# Patient Record
Sex: FEMALE | Race: BLACK OR AFRICAN AMERICAN | Employment: UNEMPLOYED | URBAN - METROPOLITAN AREA
[De-identification: names, ages, dates, MRNs, and addresses within clinical notes are randomized per-mention and may not be internally consistent; named-entity substitution may affect disease eponyms.]

---

## 2019-09-10 ENCOUNTER — HOSPITAL ENCOUNTER (EMERGENCY)
Facility: CLINIC | Age: 19
Discharge: HOME | End: 2019-09-10
Attending: PHYSICIAN ASSISTANT | Admitting: PHYSICIAN ASSISTANT
Payer: COMMERCIAL

## 2019-09-10 VITALS
TEMPERATURE: 98.8 F | DIASTOLIC BLOOD PRESSURE: 78 MMHG | RESPIRATION RATE: 18 BRPM | SYSTOLIC BLOOD PRESSURE: 110 MMHG | OXYGEN SATURATION: 99 % | HEART RATE: 85 BPM

## 2019-09-10 DIAGNOSIS — N64.4 BREAST TENDERNESS IN FEMALE: ICD-10-CM

## 2019-09-10 DIAGNOSIS — N63.10 LUMP OF RIGHT BREAST: ICD-10-CM

## 2019-09-10 DIAGNOSIS — N89.8 VAGINAL ODOR: ICD-10-CM

## 2019-09-10 LAB
ALBUMIN UR-MCNC: NEGATIVE MG/DL
APPEARANCE UR: CLEAR
BACTERIA #/AREA URNS HPF: ABNORMAL /HPF
BILIRUB UR QL STRIP: NEGATIVE
COLOR UR AUTO: ABNORMAL
GLUCOSE UR STRIP-MCNC: NEGATIVE MG/DL
HCG UR QL: NEGATIVE
HGB UR QL STRIP: NEGATIVE
KETONES UR STRIP-MCNC: NEGATIVE MG/DL
LEUKOCYTE ESTERASE UR QL STRIP: NEGATIVE
MUCOUS THREADS #/AREA URNS LPF: PRESENT /LPF
NITRATE UR QL: NEGATIVE
PH UR STRIP: 7.5 PH (ref 5–7)
RBC #/AREA URNS AUTO: <1 /HPF (ref 0–2)
SOURCE: ABNORMAL
SP GR UR STRIP: 1 (ref 1–1.03)
SPECIMEN SOURCE: NORMAL
SQUAMOUS #/AREA URNS AUTO: <1 /HPF (ref 0–1)
UROBILINOGEN UR STRIP-MCNC: NORMAL MG/DL (ref 0–2)
WBC #/AREA URNS AUTO: <1 /HPF (ref 0–5)
WET PREP SPEC: NORMAL

## 2019-09-10 PROCEDURE — 87210 SMEAR WET MOUNT SALINE/INK: CPT | Performed by: PHYSICIAN ASSISTANT

## 2019-09-10 PROCEDURE — 81001 URINALYSIS AUTO W/SCOPE: CPT | Performed by: PHYSICIAN ASSISTANT

## 2019-09-10 PROCEDURE — 87591 N.GONORRHOEAE DNA AMP PROB: CPT | Performed by: PHYSICIAN ASSISTANT

## 2019-09-10 PROCEDURE — 81025 URINE PREGNANCY TEST: CPT | Performed by: PHYSICIAN ASSISTANT

## 2019-09-10 PROCEDURE — 99284 EMERGENCY DEPT VISIT MOD MDM: CPT

## 2019-09-10 PROCEDURE — 87491 CHLMYD TRACH DNA AMP PROBE: CPT | Performed by: PHYSICIAN ASSISTANT

## 2019-09-10 ASSESSMENT — ENCOUNTER SYMPTOMS
FEVER: 0
DYSURIA: 1
ABDOMINAL PAIN: 1
CHILLS: 0
COLOR CHANGE: 0

## 2019-09-10 NOTE — ED AVS SNAPSHOT
[facility] Emergency Department  [address]  [facility] [address]  Phone:  [phone]  Fax:  [phone]                                    Lexy Marshall   MRN: [id number]    Department:  [facility] Emergency Department   Date of Visit:  9/10/2019           After Visit Summary Signature Page    I have received my discharge instructions, and my questions have been answered. I have discussed any challenges I see with this plan with the nurse or doctor.    ..........................................................................................................................................  Patient/Patient Representative Signature      ..........................................................................................................................................  Patient Representative Print Name and Relationship to Patient    ..................................................               ................................................  Date                                   Time    ..........................................................................................................................................  Reviewed by Signature/Title    ...................................................              ..............................................  Date                                               Time          22EPIC Rev 08/18

## 2019-09-11 LAB
C TRACH DNA SPEC QL NAA+PROBE: NEGATIVE
N GONORRHOEA DNA SPEC QL NAA+PROBE: NEGATIVE
SPECIMEN SOURCE: NORMAL
SPECIMEN SOURCE: NORMAL

## 2019-09-11 NOTE — DISCHARGE INSTRUCTIONS
This is likely a cyst, but you need US at breast center.   Contact [facility] tomorrow - I have placed order.   Return here for increased pain, nipple discharge, warmth.

## 2019-09-11 NOTE — RESULT ENCOUNTER NOTE
Final result for both N. Gonorrhoeae PCR and Chlamydia Trachomatis PCR are NEGATIVE.  No treatment or change in treatment per [facility] ED Lab Result protocol.

## 2019-09-11 NOTE — ED PROVIDER NOTES
History     Chief Complaint:  Breast Mass    HPI   Lexy Marshall is a 19 year old female with a history of ovarian and pilonidal cysts who presents to the emergency department with her girlfriend for evaluation of a breast mass. The patient reports that this morning at 0100 she noticed a mass in her right breast that was non-mobile with palpation. There was no associated redness, swelling, fevers, or chills. She also got her period today and has since been having some abdominal cramping. Additionally, she had a few episodes of a dysuria when urinating with a foul odor a few days ago. Patient denies use of birth control or concern for STDs.     Allergies:  No Known Drug Allergies     Medications:    Abilify  Caphosol  Orajel  Melatonin     Past Medical History:    Overdose  Depression   Ovarian cyst    Past Surgical History:    Pilonidal cyst excision     Family History:    Unknown     Social History:  Tobacco Use: Some day smoker  Alcohol Use: No  PCP: Physician No Ref-Primary  Marital Status:  Single      Review of Systems   Constitutional: Negative for chills and fever.   Gastrointestinal: Positive for abdominal pain.   Genitourinary: Positive for dysuria.   Musculoskeletal:        Positive for right breast mass   Skin: Negative for color change.   All other systems reviewed and are negative.    Physical Exam     Patient Vitals for the past 24 hrs:   BP Temp Pulse Heart Rate Resp SpO2   09/10/19 2025 -- -- 85 85 18 99 %   09/10/19 1853 110/78 98.8  F (37.1  C) 99 99 16 98 %     Physical Exam  General: Alert and interactive. Appears well. Cooperative and pleasant.   Eyes: The pupils are equal and round. EOMs intact. No scleral icterus.  ENT: No abnormalities to the external nose or ears. Mucous membranes moist. Posterior oropharynx is non-erythematous.      Neck: Trachea is in the midline. No nuchal rigidity.     CV: Regular rate and rhythm. S1 and S2 normal without murmur, click, gallop or rub.   Breast: Fibrous  breasts bilaterally. Patient has small 1 cm lump at about the 5 o'clock position in the right breast that is freely mobile and minimally tender to palpation. No significant erythema. No nipple discharge.   Resp: Breath sounds are clear bilaterally, without rhonchi, wheezes, rales. Non-labored, no retractions or accessory muscle use.     GI: Abdomen is soft without distension. No tenderness to palpation. No peritoneal signs.    : Scant bleeding in vaginal canal. No CMT.   MS: Moving all extremities well. Good muscle tone.   Skin: Warm and dry. No rash or lesions noted.  Neuro: Alert and oriented x 3. No focal neurologic deficits. Good strength and sensation in upper and lower extremities.    Psych: Awake. Alert.  Normal affect. Appropriate interactions.  Lymph: No anterior or posterior cervical lymphadenopathy noted.    Emergency Department Course   Laboratory:  UA: Clear straw urine, pH: 7.5 (H), bacteria: few, mucous: present, otherwise WNL    Few PMNs seen. No Trichomonas seen. No clue cells seen. No yeast seen.    HCG qualitative urine: Negative    Neisseria gonorrhea PCR: In process  Chlamydia trachomatis PCR: In process    Emergency Department Course:  1907 Nursing notes and vitals reviewed. I performed an exam of the patient as documented above.     The patient provided a urine sample here in the emergency department. This was sent for laboratory testing, findings above.     1940 I performed a pelvic exam. Swaps were sent.     2020 I rechecked the patient and discussed the results of her workup thus far.     Findings and plan explained to the Patient. Patient discharged home with instructions regarding supportive care, medications, and reasons to return. The importance of close follow-up was reviewed.     I personally reviewed the laboratory results with the Patient and answered all related questions prior to discharge.     Impression & Plan    Medical Decision Making:  This is a 19 year old female who  presents with her significant other for evaluation of a lump in her right breast. She is currently on her menstrual cycle but noted she had a non-mobile lump at about the 6:00 position of her right breast. She has fibrous breasts bilaterally, but on examination I am able to palpate a 1 cm freely mobile lump in the right breast. This is minimally tender to palpation. There is no nipple discharge, erythema, or signs or symptoms of infection at this time. I have explained that our breast radiologist prefer that any diagnostic breast examination be sent over to the breast center. I placed an order for an US with mammography as deemed necessary, and I gave the patient information for the breast center. Additionally, the patient complains of vaginal odor and had some burning with urination a few days ago. Her urine her shows no signs of infection and she is not currently pregnant. I did perform a pelvic exam but see no signs of bacterial vaginosis or yeast infection on her wet prep. Gonorrhea and chlamydia are pending at this time. The patient has no concerns of sexually transmitted infection and will not be treated empirically. She has no cervical motion tenderness, fevers, chills, or lower abdominal pain to suggest pelvic inflammatory disease. She will follow up with the breast center and I have given her information for primary care if she were to require a referral to primary care for a breast US. She is stable for discharge.     Diagnosis:    ICD-10-CM    1. Lump of right breast N63.10    2. Breast tenderness in female N64.4    3. Vaginal odor N89.8        Disposition:  Discharged to home    Scribe Disclosure:  I, Alberto Pham, am serving as a scribe on 9/10/2019 at 7:07 PM to personally document services performed by Maya Murphy PA-C based on my observations and the provider's statements to me.     Alberto Pham  9/10/2019   [facility] EMERGENCY DEPARTMENT       Maya Murphy  FRANCESCO  09/10/19 0540

## 2020-04-08 ENCOUNTER — HOSPITAL ENCOUNTER (EMERGENCY)
Facility: CLINIC | Age: 20
Discharge: HOME | End: 2020-04-08
Attending: EMERGENCY MEDICINE | Admitting: EMERGENCY MEDICINE
Payer: COMMERCIAL

## 2020-04-08 VITALS
HEART RATE: 88 BPM | TEMPERATURE: 98.6 F | RESPIRATION RATE: 16 BRPM | SYSTOLIC BLOOD PRESSURE: 121 MMHG | WEIGHT: 113 LBS | DIASTOLIC BLOOD PRESSURE: 87 MMHG | OXYGEN SATURATION: 99 %

## 2020-04-08 DIAGNOSIS — L02.31 ABSCESS OF RIGHT BUTTOCK: ICD-10-CM

## 2020-04-08 PROCEDURE — 10060 I&D ABSCESS SIMPLE/SINGLE: CPT

## 2020-04-08 PROCEDURE — 76857 US EXAM PELVIC LIMITED: CPT

## 2020-04-08 PROCEDURE — 99284 EMERGENCY DEPT VISIT MOD MDM: CPT | Mod: 25

## 2020-04-08 RX ORDER — CEPHALEXIN 500 MG/1
500 CAPSULE ORAL 4 TIMES DAILY
Qty: 28 CAPSULE | Refills: 0 | Status: SHIPPED | OUTPATIENT
Start: 2020-04-08 | End: 2020-04-15

## 2020-04-08 RX ORDER — LIDOCAINE HYDROCHLORIDE AND EPINEPHRINE 10; 10 MG/ML; UG/ML
INJECTION, SOLUTION INFILTRATION; PERINEURAL
Status: DISCONTINUED
Start: 2020-04-08 | End: 2020-04-08 | Stop reason: HOSPADM

## 2020-04-08 RX ORDER — SULFAMETHOXAZOLE/TRIMETHOPRIM 800-160 MG
2 TABLET ORAL 2 TIMES DAILY
Qty: 28 TABLET | Refills: 0 | Status: SHIPPED | OUTPATIENT
Start: 2020-04-08 | End: 2020-04-15

## 2020-04-08 RX ORDER — HYDROCODONE BITARTRATE AND ACETAMINOPHEN 5; 325 MG/1; MG/1
1 TABLET ORAL EVERY 6 HOURS PRN
Qty: 8 TABLET | Refills: 0 | Status: SHIPPED | OUTPATIENT
Start: 2020-04-08 | End: 2020-04-11

## 2020-04-08 ASSESSMENT — ENCOUNTER SYMPTOMS
FEVER: 0
SHORTNESS OF BREATH: 0
VOMITING: 0

## 2020-04-08 NOTE — ED AVS SNAPSHOT
[facility] Emergency Department  [address]  [facility] [address]  Phone:  [phone]  Fax:  [phone]                                    Lexy Marshall   MRN: [id number]    Department:  [facility] Emergency Department   Date of Visit:  4/8/2020           After Visit Summary Signature Page    I have received my discharge instructions, and my questions have been answered. I have discussed any challenges I see with this plan with the nurse or doctor.    ..........................................................................................................................................  Patient/Patient Representative Signature      ..........................................................................................................................................  Patient Representative Print Name and Relationship to Patient    ..................................................               ................................................  Date                                   Time    ..........................................................................................................................................  Reviewed by Signature/Title    ...................................................              ..............................................  Date                                               Time          22EPIC Rev 08/18

## 2020-04-08 NOTE — DISCHARGE INSTRUCTIONS
Please make an appointment to follow up with [facility] [phone] in 3 days even if entirely better.

## 2020-04-08 NOTE — LETTER
April 8, 2020      To Whom It May Concern:      Lexy Marshall was seen in our Emergency Department today, 04/08/20.  I expect her condition to improve over the next 2-3 days.  She may return to work/school when improved.    Sincerely,        Kenia Swartz RN

## 2020-04-08 NOTE — ED PROVIDER NOTES
History     Chief Complaint:  Wound Check       HPI   Lexy Marsahll is a 20 year old female who presents with right buttock pain x1 day.  She has a history of buttock abscess that has required incision and drainage on 2 separate occasions.  She began to have the progressive onset of pain at the same location yesterday.  The pain is sharp, localized and aggravated by any pressure or touching to the area.  She has otherwise felt well with no nausea, vomiting, fever or myalgias..    Allergies:  No Known Allergies     Medications:    abilify     Past Medical History:    Depressed affect   Bipolar 2 disorder  Anxiety     Past Surgical History:    Excise pilonidal cyst    Family History:    Family history reviewed. No pertinent family history.      Social History:  Tobacco use: yes  Alcohol use: no    Review of Systems   Constitutional: Negative for fever.   Respiratory: Negative for shortness of breath.    Gastrointestinal: Negative for vomiting.   All other systems reviewed and are negative.      Physical Exam     Patient Vitals for the past 24 hrs:   BP Temp Temp src Pulse Heart Rate Resp SpO2 Weight   04/08/20 0516 121/87 98.6  F (37  C) Oral 88 88 16 99 % 51.3 kg (113 lb)        Physical Exam  Skin:               General:   Pleasant, age appropriate.  Eyes:    Conjunctiva normal  Neck:    Supple, no meningismus.     CV:     Regular rate and rhythm.      No murmurs, rubs or gallops.       No  lower extremity edema.  PULM:    Clear to auscultation bilateral.       No respiratory distress.      Good air exchange.  ABD:    Soft, non-tender, non-distended.       No rebound, guarding or rigidity.  MSK:     No gross deformity to all four extremities.   LYMPH:   No cervical lymphadenopathy.  NEURO:   Alert, good muscular tone, no atrophy.   Skin:    Warm, dry   Psych:    Mood is good and affect is appropriate.        Emergency Department Course     Procedures:      Procedure: Incision and Drainage   LOCATIONS:  Right  buttock     ANESTHESIA:  Local field block using Lidocaine 1% with epinephrine, total of 3 mLs     PREPARATION:  Cleansed with Betadine     PROCEDURE:  Area was incised with # 11 Blade (Sharp Point) with a Single Straight incision.  Wound treatment included Deloculation and Purulent Drainage.  Packing consisted of No Packing.  Appropriate dressing was applied to cover the area.    Patient Status:        Patient tolerated the procedure well. There were no complications.              Emergency Department Course:  Past medical records, nursing notes, and vitals reviewed.    0524 I performed an exam of the patient as documented above.     0558 Patient rechecked and updated.       Findings and plan explained to the Patient. Patient discharged home with instructions regarding supportive care, medications, and reasons to return. The importance of close follow-up was reviewed. The patient was prescribed keflex, norco, bactrim DS.        Impression & Plan     Medical Decision Making:    Lxey Marshall is a 20 year old female who presents to the emergency department today with progressive right buttock pain with prior history of buttock abscess.  On ultrasound there is a small fluid collection measuring 0.6 cm but appears to be tracking throughout the soft tissue. She underwent incision and drainage with a small amount of purulent material returned.  Patient will be discharged home on cephalexin and Bactrim.  Sitz baths and analgesics provided.  Follow-up with PCP and return to the ED for any worsening symptoms.      Discharge Diagnosis:    ICD-10-CM    1. Abscess of right buttock  L02.31        Disposition:  Discharged home.      Discharge Medications:  New Prescriptions    CEPHALEXIN (KEFLEX) 500 MG CAPSULE    Take 1 capsule (500 mg) by mouth 4 times daily for 7 days    HYDROCODONE-ACETAMINOPHEN (NORCO) 5-325 MG TABLET    Take 1 tablet by mouth every 6 hours as needed for severe pain    SULFAMETHOXAZOLE-TRIMETHOPRIM (BACTRIM  DS) 800-160 MG TABLET    Take 2 tablets by mouth 2 times daily for 7 days       Scribe Disclosure:  I, Sae Ana, am serving as a scribe at 5:24 AM on 4/8/2020 to document services personally performed by Francisco Javier Boateng MD based on my observations and the provider's statements to me.       Francisco Javier Boateng MD  04/08/20 0703

## 2020-04-16 ENCOUNTER — TELEPHONE (OUTPATIENT)
Dept: SURGERY | Facility: CLINIC | Age: 20
End: 2020-04-16

## 2020-04-16 ENCOUNTER — HOSPITAL ENCOUNTER (EMERGENCY)
Facility: CLINIC | Age: 20
Discharge: HOME | End: 2020-04-16
Attending: PHYSICIAN ASSISTANT | Admitting: PHYSICIAN ASSISTANT
Payer: COMMERCIAL

## 2020-04-16 VITALS — TEMPERATURE: 98 F | SYSTOLIC BLOOD PRESSURE: 116 MMHG | OXYGEN SATURATION: 99 % | DIASTOLIC BLOOD PRESSURE: 86 MMHG

## 2020-04-16 DIAGNOSIS — Z48.00 ENCOUNTER FOR CHANGE OF DRESSING: ICD-10-CM

## 2020-04-16 DIAGNOSIS — L05.91 PILONIDAL CYST: ICD-10-CM

## 2020-04-16 PROCEDURE — 99282 EMERGENCY DEPT VISIT SF MDM: CPT

## 2020-04-16 ASSESSMENT — ENCOUNTER SYMPTOMS
CHILLS: 0
FEVER: 0
WOUND: 1

## 2020-04-16 NOTE — TELEPHONE ENCOUNTER
PT called for a silvai consult today after being seen in the ED.  I had KARIN review the chart and she determined that this PT needs to wait for a consult for now.      Her notes: Based on the ED note it sounds like the acute abscess infection is appropriately treated with the I&D and now she just needs time for wound healing. We would not be able to offer her any definitive surgery at this point. I would recommend follow up with the surgeon or physician who did the most recent I&D procedure or her PCP to document ongoing healing. She should continue daily packing changes at home. Once the infection is resolved she can come follow up with us in the future for definitive excision of the pilonidal sinus areas but that will likely not be for at least a month     mms

## 2020-04-16 NOTE — DISCHARGE INSTRUCTIONS
Have your girlfriend or primary care change this dressing in 48 hours.   Watch for fevers/chills, increased pain at that area.   Make a follow up appointment with general surgery.

## 2020-04-16 NOTE — ED AVS SNAPSHOT
[facility] Emergency Department  [address]  [facility] [address]  Phone:  [phone]  Fax:  [phone]                                    Lexy Marshall   MRN: [id number]    Department:  [facility] Emergency Department   Date of Visit:  4/16/2020           After Visit Summary Signature Page    I have received my discharge instructions, and my questions have been answered. I have discussed any challenges I see with this plan with the nurse or doctor.    ..........................................................................................................................................  Patient/Patient Representative Signature      ..........................................................................................................................................  Patient Representative Print Name and Relationship to Patient    ..................................................               ................................................  Date                                   Time    ..........................................................................................................................................  Reviewed by Signature/Title    ...................................................              ..............................................  Date                                               Time          22EPIC Rev 08/18

## 2020-04-16 NOTE — ED PROVIDER NOTES
"  History   Chief Complaint:  Wound Check     HPI   Lexy Marshall is a 20 year old female, current some day smoker who presents for evaluation of a wound check. The patient reports that she was seen here in the ED on 04/08 for evaluation of an abscess on her right buttock at which time an incision and drainage procedure was performed. She was discharged home with a one-week course of Bactrim and Keflex which she has now finished. She states that she continued to have pain several days after that visit, therefore she went to [facility] for further evaluation three days ago. She notes another, \"deeper\" abscess was found and another incision and drainage was performed followed by packing. The patient presents today to have the packing replaced as she does not have any at home. In the ED, she states she has some persisting pain though it is much improved. She denies fever and chills. The patient has a history of similar abscess but has never seen a surgeon for this.     Allergies:  No known drug allergies    Medications:   Abilify  Melatonin    Past Medical History:    Depressed affect  Overdose    Past Surgical History:    Excise pilonidal cyst, simple     Family History:    History reviewed. No pertinent family history.     Social History:  Smoking status- current some day smoker  Alcohol use- no  Drug use- no    Review of Systems   Constitutional: Negative for chills and fever.   Skin: Positive for wound (abscess to right buttock, painful).   All other systems reviewed and are negative.      Physical Exam     Patient Vitals for the past 24 hrs:   BP Temp Temp src Heart Rate SpO2   04/16/20 1127 116/86 98  F (36.7  C) Temporal 73 99 %     Physical Exam  General: Alert and interactive. Appears well.   Head: Atraumatic, without obvious lesion, abrasion, hematoma.   Eyes: The pupils are equal and round. No scleral icterus.   ENT: No obvious abnormalities to the ears or nose. Mucous membranes moist.   Neck:Trachea is " in the midline. No obvious swelling to the neck. Full range of motion.   CV: Regular rate. Extremities well perfused.  Resp: Non-labored, no retractions or accessory muscle use.     GI: Abdomen is not distended.   MS: Moving all extremities well.   Skin: Two 1.5 cm vertical incisions present to the right of the midline buttocks, superior aspect of gluteal cleft. Small amount of swelling, no induration or erythema. Two pieces of gauze present within the cavity of previous drainage. No significant tenderness to palpation.  Neuro: Alert and oriented x 3. Non-focal examination.    Psych: Awake. Alert.  Normal affect. Appropriate interactions.    Emergency Department Course     Procedures      Wound packing      INDICATION: Packing replacement    PERFORMED BY: Maya Murphy PA-C    LOCATION:  Right buttock    NOTE: Wound was probed and 1-inch normal gauze pack was placed. Patient tolerated the procedure well with no immediate complications.    Emergency Department Course:  Past medical records, nursing notes, and vitals reviewed.   1111 I performed an exam of the patient and obtained history, as documented above.    1122 I rechecked the patient, repacked the patient's wound. Explained findings to the patient.    Findings and plan explained to the patient. Patient discharged home with instructions regarding supportive care, medications, and reasons to return. The importance of close follow-up was reviewed.    Impression & Plan    Medical Decision Making:  Lexy Marshall is a 20 year old female who presents for evaluation of a dressing change. Patient had a pilonidal cyst drained twice over the past week. She presents here to get the packing removed and the wound checked. On my examination, the patient has two vertical incisions to the right of midline buttocks at the area of a recent pilonidal abscess. She has completed a course of antibiotics and appears to be healing appropriately. There is no significant induration,  purulent drainage, or erythema surrounding the area. I did remove the packing gauze, replaced it, and will have her have this rechecked in 48 hours either by primary care or girlfriend can change the packing. She will return here for increased swelling or pain, fever or chills, or any other worsening symptoms. Additionally, I have provided her information for general surgery for definitive management of the cyst/abscess.    Diagnosis:    ICD-10-CM   1. Encounter for change of dressing  Z48.00   2. Pilonidal cyst  L05.91        Disposition:  Discharged to home.      4/16/2020   Shaun Roblero I, Shaun Roblero, am serving as a scribe at 11:11 AM on 4/16/2020 to document services personally performed by Maya Murphy PA-C based on my observations and the provider's statements to me.      Maya Murphy PA-C  04/16/20 1153

## 2020-05-06 ENCOUNTER — NURSE TRIAGE (OUTPATIENT)
Dept: NURSING | Facility: CLINIC | Age: 20
End: 2020-05-06

## 2020-05-06 ENCOUNTER — APPOINTMENT (OUTPATIENT)
Dept: GENERAL RADIOLOGY | Facility: CLINIC | Age: 20
End: 2020-05-06
Attending: EMERGENCY MEDICINE
Payer: COMMERCIAL

## 2020-05-06 ENCOUNTER — HOSPITAL ENCOUNTER (EMERGENCY)
Facility: CLINIC | Age: 20
Discharge: HOME | End: 2020-05-06
Attending: EMERGENCY MEDICINE | Admitting: EMERGENCY MEDICINE
Payer: COMMERCIAL

## 2020-05-06 VITALS
WEIGHT: 115.52 LBS | TEMPERATURE: 98.1 F | SYSTOLIC BLOOD PRESSURE: 110 MMHG | RESPIRATION RATE: 18 BRPM | HEART RATE: 68 BPM | OXYGEN SATURATION: 100 % | DIASTOLIC BLOOD PRESSURE: 83 MMHG

## 2020-05-06 DIAGNOSIS — R09.81 NASAL CONGESTION: ICD-10-CM

## 2020-05-06 DIAGNOSIS — R07.89 CHEST WALL PAIN: ICD-10-CM

## 2020-05-06 PROCEDURE — 71045 X-RAY EXAM CHEST 1 VIEW: CPT

## 2020-05-06 PROCEDURE — 25000128 H RX IP 250 OP 636: Performed by: EMERGENCY MEDICINE

## 2020-05-06 PROCEDURE — 99284 EMERGENCY DEPT VISIT MOD MDM: CPT | Mod: 25

## 2020-05-06 PROCEDURE — 96374 THER/PROPH/DIAG INJ IV PUSH: CPT

## 2020-05-06 RX ORDER — OXYMETAZOLINE HCL 0.05 %
2 SPRAY, NON-AEROSOL (ML) NASAL 2 TIMES DAILY
Qty: 1 BOTTLE | Refills: 0 | Status: SHIPPED | OUTPATIENT
Start: 2020-05-06 | End: 2020-05-09

## 2020-05-06 RX ORDER — DEXAMETHASONE SODIUM PHOSPHATE 10 MG/ML
10 INJECTION, SOLUTION INTRAMUSCULAR; INTRAVENOUS ONCE
Status: COMPLETED | OUTPATIENT
Start: 2020-05-06 | End: 2020-05-06

## 2020-05-06 RX ORDER — FLUTICASONE PROPIONATE 50 MCG
1 SPRAY, SUSPENSION (ML) NASAL 2 TIMES DAILY
Qty: 9.9 ML | Refills: 0 | Status: SHIPPED | OUTPATIENT
Start: 2020-05-06 | End: 2020-05-09

## 2020-05-06 RX ADMIN — DEXAMETHASONE SODIUM PHOSPHATE 10 MG: 10 INJECTION, SOLUTION INTRAMUSCULAR; INTRAVENOUS at 10:59

## 2020-05-06 ASSESSMENT — ENCOUNTER SYMPTOMS
FEVER: 0
SORE THROAT: 1
NAUSEA: 0
DIARRHEA: 0
SHORTNESS OF BREATH: 1
CHILLS: 0
COUGH: 1
VOMITING: 0

## 2020-05-06 NOTE — DISCHARGE INSTRUCTIONS
You should return the emergency department should you have worsening chest pain, chest pressure or shortness of breath.  I recommend a heat pack, alternating doses of Tylenol and ibuprofen as you likely pulled a muscle.  You can try taking the Afrin and Flonase to see if this helps with your congestion and your cough.

## 2020-05-06 NOTE — ED PROVIDER NOTES
History   Chief Complaint:  Nasal Congestion     HPI   Lexy Marshall is a 20 year old female with no pertinent medical history who presents for evaluation of nasal congestion, associated with a cough and left-sided chest pain, that began about 2 weeks ago. The patient states for the past two weeks she has had nasal congestion and a cough for the past two weeks. She also endorses an intermittent sore throat, which has since alleviated. Since initial onset of her symptoms, she has developed left-sided chest pain, which is exacerbated by ambulation and touch. To alleviate her symptoms, she has been using Mucinex, DayQuil, and NyQuil. Due to her symptoms not subsiding and her chest pain worsening, she presented for evaluation.    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 She endorses feeling short of breath secondary to her pain. She denies any fever, chills, nausea, emesis, diarrhea, or other symptoms prompting her presentation. She denies any known environmental allergies that could be causing her symptoms. She notes she is not on birth control and denies any chance of being pregnant.     Allergies:  No Known Drug Allergies     Medications:   Abilify   Melatonin     Past Medical History:    Depression    Anxiety   Bipolar 1 disorder  Varicella     Past Surgical History:    Excise pilonidal cyst     Family History:    Father - Alcohol/drug abuse  Mother - Anxiety, Asthma     Social History:  The patient was unaccompanied to the ED.  Smoking Status: Current  Smokeless Tobacco: Never Used  Alcohol Use: Yes  Drug Use: Yes - Marijuana   PCP: No Ref-Primary, Physician     Review of Systems   Constitutional: Negative for chills and fever.   HENT: Positive for congestion and sore throat (Alleviated).    Respiratory: Positive for cough and shortness of breath.    Cardiovascular: Positive for chest pain.   Gastrointestinal: Negative for diarrhea, nausea  and vomiting.   All other systems reviewed and are negative.      Physical Exam     Patient Vitals for the past 24 hrs:   BP Temp Temp src Pulse Resp SpO2 Weight   05/06/20 1045 -- -- -- -- -- 100 % --   05/06/20 1038 -- -- -- -- -- -- 52.4 kg (115 lb 8.3 oz)   05/06/20 1036 110/83 98.1  F (36.7  C) Temporal 68 18 100 % --     Physical Exam  Constitutional: Alert, attentive, GCS 15  HENT:    Nose: Nose normal.    Mouth/Throat: Oropharynx is clear, mucous membranes are moist  Eyes: EOM are normal, anicteric, conjugate gaze  CV: regular rate and rhythm; no murmurs  Chest: Effort normal and breath sounds clear without wheezing or rales, symmetric bilaterally   GI:  non tender. No distension. No guarding or rebound.    MSK: No LE edema, no tenderness to palpation of BLE. Focal, fully reproducible anterior left chest wall pain.  Neurological: Alert, attentive, moving all extremities equally.   Skin: Skin is warm and dry.   Emergency Department Course   Imaging:  Radiology findings were communicated with the patient who voiced understanding of the findings.    XR Chest Port 1 View   IMPRESSION: No acute disease.  Reading per radiology.      Interventions:  1059 Decadron 10 mg IV    Emergency Department Course:  Past medical records, nursing notes, and vitals reviewed.  The patient was sent for a XR Chest Port 1 View while in the emergency department, results above.      (1042)   I performed an exam of the patient as documented above. History obtained from patient.     (1140)   I rechecked the patient and discussed results and plan of care.     Findings and plan explained to the Patient. Patient discharged home with instructions regarding supportive care, medications, and reasons to return. The importance of close follow-up was reviewed. The patient was prescribed Afrin and Flonase. I personally reviewed the imaging results with the Patient and answered all related questions prior to discharge.     Impression & Plan      Medical Decision Makin-year-old woman with past medical history significant for depression, anxiety bipolar disorder presenting for evaluation of several weeks nasal congestion associated with nonproductive cough and some intermittent left-sided chest pain.  On exam, her chest pain is fully reproducible suggestive of musculoskeletal etiology and her chest x-ray is clear with no evidence of pneumothorax.  She is PERC negative.  I suspect likely seasonal allergies as the etiology of her congestion given her history of the same.  I do not suspect coronavirus at this time and she does not meet criteria for testing.  I recommended initiation of allergy medications and gave her Flonase and Afrin prescription here.  Return precautions were reviewed and she was discharged home    Diagnosis:    ICD-10-CM    1. Nasal congestion  R09.81    2. Chest wall pain  R07.89      Disposition:  Discharged to home.    Discharge Medications:     Medication List      Started    Afrin Nasal Spray 0.05 % nasal spray  Generic drug:  oxymetazoline  2 sprays, Nasal, 2 TIMES DAILY     fluticasone 50 MCG/ACT nasal spray  Commonly known as:  FLONASE  1 spray, Both Nostrils, 2 TIMES DAILY        ASK your doctor about these medications    cephALEXin 500 MG capsule  Commonly known as:  KEFLEX  500 mg, Oral, 4 TIMES DAILY  Ask about: Should I take this medication?     HYDROcodone-acetaminophen 5-325 MG tablet  Commonly known as:  NORCO  1 tablet, Oral, EVERY 6 HOURS PRN  Ask about: Should I take this medication?     sulfamethoxazole-trimethoprim 800-160 MG tablet  Commonly known as:  Bactrim DS  2 tablets, Oral, 2 TIMES DAILY  Ask about: Should I take this medication?           Shaun Armstrong MD  [employer]  10:13 AM 20     Scribe Disclosure:  Polo BUTLER, am serving as a scribe at 10:40 AM on 2020 to document services personally performed by Shaun Armstrong MD based on my observations  and the provider's statements to me.  May 6, 2020   [facility] EMERGENCY DEPARTMENT        Shaun Armstrong MD  05/07/20 1016

## 2020-05-06 NOTE — ED AVS SNAPSHOT
[facility] Emergency Department  [address]  [facility] [address]  Phone:  [phone]  Fax:  [phone]                                    Lexy Marshall   MRN: [id number]    Department:  [facility] Emergency Department   Date of Visit:  5/6/2020           After Visit Summary Signature Page    I have received my discharge instructions, and my questions have been answered. I have discussed any challenges I see with this plan with the nurse or doctor.    ..........................................................................................................................................  Patient/Patient Representative Signature      ..........................................................................................................................................  Patient Representative Print Name and Relationship to Patient    ..................................................               ................................................  Date                                   Time    ..........................................................................................................................................  Reviewed by Signature/Title    ...................................................              ..............................................  Date                                               Time          22EPIC Rev 08/18

## 2020-05-06 NOTE — ED TRIAGE NOTES
Pt arrives with nasal congestion and cough x 2 wks keeping her awak at night. Pain in left side chest/rib  with movement and coughing. A/ox 3, ABCs intact.

## 2020-05-06 NOTE — TELEPHONE ENCOUNTER
Yellow nasal congestion non-stop over two weeks.  She wakes up choking at night.    Cough productive   Rib pain    No fever  Doesn't feel ill.  Occasional sore throat    Using OTC's for cough/cold without improvement of symptoms.  I instructed she be seen in ER.  She agreed.  I called [facility] and spoke to a triage nurse in the ER, giving her the information.      COVID 19 Nurse Triage Plan/Patient Instructions    Please be aware that novel coronavirus (COVID-19) may be circulating in the community. If you develop symptoms such as fever, cough, or SOB or if you have concerns about the presence of another infection including coronavirus (COVID-19), please contact your health care provider or visit [email or web address].     Disposition/Instructions    Patient to go to ED and follow protocol based instructions. Follow System Ambulatory Workflow for COVID 19.     Bring Your Own Device:  Please also bring your smart device(s) (smart phones, tablets, laptops) and their charging cables for your personal use and to communicate with your care team during your visit.    Thank you for limiting contact with others, wearing a simple mask to cover your cough, practice good hand hygiene habits and accessing our virtual services where possible to limit the spread of this virus.    For more information about COVID19 and options for caring for yourself at home, please visit the CDC website at https://www.cdc.gov/coronavirus/2019-ncov/about/steps-when-sick.html  For more options for care at [facility], please visit our website at [email or web address]    For more information, please use the [place] Department of Health COVID-19 Website: https://www.health.state.[place]/diseases/coronavirus/index.html  [place] Department of Health ([facility]) COVID-19 Hotlines (Interpreters available):      Health questions: Phone Number: [phone] or [phone] and Hours: 7 a.m. to 7 p.m.    Schools and   questions: Phone Number: [phone] or [phone] and Hours 7 a.m. to 7 p.m.      Reason for Disposition    Chest pain  (Exception: MILD central chest pain, present only when coughing)    Additional Information    Negative: Severe difficulty breathing (e.g., struggling for each breath, speaks in single words)    Negative: Bluish (or gray) lips or face now    Negative: [1] Difficulty breathing AND [2] exposure to flames, smoke, or fumes    Negative: [1] Stridor AND [2] difficulty breathing    Negative: Sounds like a life-threatening emergency to the triager    Protocols used: COUGH - ACUTE [id number]    Sun KINGSTON RN [facility]

## 2020-09-21 ENCOUNTER — HOSPITAL ENCOUNTER (OUTPATIENT)
Facility: CLINIC | Age: 20
End: 2020-09-21
Attending: SURGERY | Admitting: SURGERY
Payer: COMMERCIAL

## 2020-09-21 ENCOUNTER — PREP FOR PROCEDURE (OUTPATIENT)
Dept: SURGERY | Facility: CLINIC | Age: 20
End: 2020-09-21

## 2020-09-21 ENCOUNTER — OFFICE VISIT (OUTPATIENT)
Dept: SURGERY | Facility: CLINIC | Age: 20
End: 2020-09-21
Payer: COMMERCIAL

## 2020-09-21 VITALS
HEIGHT: 62 IN | OXYGEN SATURATION: 98 % | DIASTOLIC BLOOD PRESSURE: 64 MMHG | SYSTOLIC BLOOD PRESSURE: 104 MMHG | BODY MASS INDEX: 21.16 KG/M2 | HEART RATE: 94 BPM | RESPIRATION RATE: 16 BRPM | WEIGHT: 115 LBS

## 2020-09-21 DIAGNOSIS — L98.8 PILONIDAL DISEASE: Primary | ICD-10-CM

## 2020-09-21 DIAGNOSIS — L98.8 PILONIDAL DISEASE: ICD-10-CM

## 2020-09-21 DIAGNOSIS — Z11.59 ENCOUNTER FOR SCREENING FOR OTHER VIRAL DISEASES: Primary | ICD-10-CM

## 2020-09-21 PROCEDURE — 99203 OFFICE O/P NEW LOW 30 MIN: CPT | Performed by: SURGERY

## 2020-09-21 ASSESSMENT — MIFFLIN-ST. JEOR: SCORE: 1234.89

## 2020-09-22 ENCOUNTER — TELEPHONE (OUTPATIENT)
Dept: SURGERY | Facility: CLINIC | Age: 20
End: 2020-09-22

## 2020-09-22 NOTE — TELEPHONE ENCOUNTER
CANCELLED 9/30/2020 NMS     Type of surgery: PILONIDAL EXCISION      Location of surgery: [facility] OR  Date and time of surgery: 10/1/2020 @ 1:15 PM   Surgeon: Minerva Holbrook MD    Pre-Op Appt Date: PATIENT TO SCHEDULE    Post-Op Appt Date: PATIENT TO SCHEDULE     Packet sent out: Yes  PACKET AND SOAP GIVEN TO PATIENT    Pre-cert/Authorization completed:  Not Applicable  Date: 9/21/2020      PILONIDAL EXCISION     GENERAL PT INST TO HAVE H&P WITH PCP 60 MIN REQ PA ASSIST JLS NMS

## 2020-09-29 RX ORDER — CEFAZOLIN SODIUM 1 G/3ML
1 INJECTION, POWDER, FOR SOLUTION INTRAMUSCULAR; INTRAVENOUS SEE ADMIN INSTRUCTIONS
Status: CANCELLED | OUTPATIENT
Start: 2020-09-29

## 2020-09-29 RX ORDER — CEFAZOLIN SODIUM 2 G/100ML
2 INJECTION, SOLUTION INTRAVENOUS
Status: CANCELLED | OUTPATIENT
Start: 2020-09-29